# Patient Record
Sex: MALE | Race: OTHER | Employment: FULL TIME | URBAN - METROPOLITAN AREA
[De-identification: names, ages, dates, MRNs, and addresses within clinical notes are randomized per-mention and may not be internally consistent; named-entity substitution may affect disease eponyms.]

---

## 2023-08-28 ENCOUNTER — NURSE TRIAGE (OUTPATIENT)
Age: 68
End: 2023-08-28

## 2023-08-28 NOTE — TELEPHONE ENCOUNTER
Regarding: Blood in urine  ----- Message from Shahana Garcia sent at 8/28/2023 11:05 AM EDT -----  Pt called and stated that he has blood in his urine for the past week or two. And he states he is having difficulty urinating. Over the last month or two when he states up he has urgency. He is a new patient never been here before.

## 2023-08-28 NOTE — TELEPHONE ENCOUNTER
Pt reports intermittent blood in urine. On blood thinner, does clear up when he hold them. Cardiology recommends urology appt. Pt would like to only see Dr. Kasey Car. Appt scheduled for 10/31/23. Did advise patient to also see PCP due to appt timeframe to make sure no infection. Pt reports does have appt with them next week.      Reason for Disposition  • Patient wants to be seen    Protocols used: URINE - BLOOD IN-ADULT-OH

## 2023-09-22 ENCOUNTER — HOSPITAL ENCOUNTER (OUTPATIENT)
Dept: RADIOLOGY | Age: 68
Discharge: HOME | End: 2023-09-22
Payer: MEDICARE

## 2023-09-22 DIAGNOSIS — R31.0 GROSS HEMATURIA: ICD-10-CM

## 2023-09-22 DIAGNOSIS — N20.0 CALCULUS OF KIDNEY: ICD-10-CM

## 2023-09-22 PROCEDURE — G1004 CDSM NDSC: HCPCS

## 2023-09-22 PROCEDURE — 74178 CT ABD&PLV WO CNTR FLWD CNTR: CPT

## 2023-09-22 RX ADMIN — IOHEXOL 100 ML: 350 INJECTION, SOLUTION INTRAVENOUS at 14:35

## 2023-10-31 ENCOUNTER — TELEPHONE (OUTPATIENT)
Age: 68
End: 2023-10-31

## 2023-10-31 ENCOUNTER — OFFICE VISIT (OUTPATIENT)
Dept: UROLOGY | Facility: AMBULATORY SURGERY CENTER | Age: 68
End: 2023-10-31
Payer: MEDICARE

## 2023-10-31 VITALS
HEIGHT: 68 IN | WEIGHT: 278 LBS | HEART RATE: 74 BPM | SYSTOLIC BLOOD PRESSURE: 140 MMHG | BODY MASS INDEX: 42.13 KG/M2 | DIASTOLIC BLOOD PRESSURE: 72 MMHG

## 2023-10-31 DIAGNOSIS — K40.90 UNILATERAL INGUINAL HERNIA WITHOUT OBSTRUCTION OR GANGRENE, RECURRENCE NOT SPECIFIED: ICD-10-CM

## 2023-10-31 DIAGNOSIS — N20.0 NEPHROLITHIASIS: ICD-10-CM

## 2023-10-31 DIAGNOSIS — R31.9 HEMATURIA, UNSPECIFIED TYPE: Primary | ICD-10-CM

## 2023-10-31 DIAGNOSIS — R31.0 GROSS HEMATURIA: ICD-10-CM

## 2023-10-31 LAB
SL AMB  POCT GLUCOSE, UA: ABNORMAL
SL AMB LEUKOCYTE ESTERASE,UA: ABNORMAL
SL AMB POCT BILIRUBIN,UA: ABNORMAL
SL AMB POCT BLOOD,UA: ABNORMAL
SL AMB POCT CLARITY,UA: ABNORMAL
SL AMB POCT COLOR,UA: ABNORMAL
SL AMB POCT KETONES,UA: ABNORMAL
SL AMB POCT NITRITE,UA: ABNORMAL
SL AMB POCT PH,UA: 5
SL AMB POCT SPECIFIC GRAVITY,UA: 1.02
SL AMB POCT URINE PROTEIN: ABNORMAL
SL AMB POCT UROBILINOGEN: 0.2

## 2023-10-31 PROCEDURE — 81002 URINALYSIS NONAUTO W/O SCOPE: CPT | Performed by: UROLOGY

## 2023-10-31 PROCEDURE — 99204 OFFICE O/P NEW MOD 45 MIN: CPT | Performed by: UROLOGY

## 2023-10-31 RX ORDER — DOFETILIDE 0.5 MG/1
CAPSULE ORAL
COMMUNITY
Start: 2020-01-19

## 2023-10-31 RX ORDER — SODIUM CHLORIDE 9 MG/ML
125 INJECTION, SOLUTION INTRAVENOUS CONTINUOUS
OUTPATIENT
Start: 2023-10-31

## 2023-10-31 RX ORDER — OMEGA-3/DHA/EPA/FISH OIL 300-1000MG
2 CAPSULE ORAL DAILY
COMMUNITY

## 2023-10-31 RX ORDER — DILTIAZEM HYDROCHLORIDE 120 MG/1
120 CAPSULE, COATED, EXTENDED RELEASE ORAL DAILY
COMMUNITY
Start: 2023-08-11

## 2023-10-31 RX ORDER — DIPHENOXYLATE HYDROCHLORIDE AND ATROPINE SULFATE 2.5; .025 MG/1; MG/1
1 TABLET ORAL DAILY
COMMUNITY

## 2023-10-31 RX ORDER — OLMESARTAN MEDOXOMIL 20 MG/1
TABLET ORAL
COMMUNITY

## 2023-10-31 NOTE — TELEPHONE ENCOUNTER
Pt saw Dr. Tonya Garcia today. Pt was not sure if  would call him today or if he would receive the call in the next few days. I explained the CS call timeframe of 5 business days. PT was fine with that. He just wasn't sure if he would be called today.

## 2023-10-31 NOTE — PROGRESS NOTES
10/31/2023    Maria Esther Vegas  1955  [id number]        Assessment  Gross hematuria on anticoagulation, 1 cm right renal pelvis calculus, large sliding right inguinal hernia containing bladder, routine prostate cancer screening, family history of prostate cancer      Discussion  First we discussed his CT renal protocol which fortunately shows no evidence of malignancy or neoplastic process. The scan does reveal 1 cm right renal pelvis calculus which could very well be the etiology for his gross hematuria on Pradaxa. As he will likely be on anticoagulation indefinitely we discussed performing right-sided ureteroscopy with holmium laser lithotripsy, right retrograde pyelography and right ureteral stent insertion to remove the stone which is too large to pass and to alleviate his intermittent gross hematuria. I recommend that he hold his Pradaxa for 48 hours prior to surgery as he is on anticoagulation for atrial fibrillation and currently he is in sinus rhythm. Next we discussed his large right inguinal hernia containing a portion of his bladder. I recommended referral to Dr. Candance Fleming for evaluation. Surgery for inguinal hernia repair could be challenging secondary to the patient's body habitus. Last we discussed his family history of prostate cancer. His most recent PSA level is under 2. The patient defers digital rectal examination today and this will be performed at the time of ureteroscopy. History of Present Illness  77 y.o. male with a history of gross hematuria. This has been intermittent. He denies any flank pain. He had a CT renal protocol as per below. Recent PSA level from September 2023 is 1.86. He states that his father had prostate cancer. He underwent a CT renal protocol performed on September 22, 2023 which reveals multiple simple renal cyst on the right kidney. There is a 1 cm nonobstructing renal pelvis calculus on the right.   A moderate right inguinal hernia containing a portion of the bladder is identified as well. 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 AUA Symptom Score  AUA SYMPTOM SCORE      Flowsheet Row Most Recent Value   AUA SYMPTOM SCORE    How often have you had a sensation of not emptying your bladder completely after you finished urinating? 2 (P)     How often have you had to urinate again less than two hours after you finished urinating? 2 (P)     How often have you found you stopped and started again several times when you urinate? 0 (P)     How often have you found it difficult to postpone urination? 1 (P)     How often have you had a weak urinary stream? 1 (P)     How often have you had to push or strain to begin urination? 2 (P)     How many times did you most typically get up to urinate from the time you went to bed at night until the time you got up in the morning? 3 (P)     Quality of Life: If you were to spend the rest of your life with your urinary condition just the way it is now, how would you feel about that? 3 (P)     AUA SYMPTOM SCORE 11 (P)             Review of Systems  Review of Systems   Constitutional: Negative. HENT: Negative. Eyes: Negative. Respiratory: Negative. Cardiovascular: Negative. Gastrointestinal: Negative. Endocrine: Negative. Genitourinary:         Per HPI   Musculoskeletal: Negative. Skin: Negative. Allergic/Immunologic: Negative. Neurological: Negative. Hematological: Negative. Psychiatric/Behavioral: Negative. Past Medical History  Past Medical History:   Diagnosis Date    History of kidney stones     Hypertension     Irregular heart beat     Macular degeneration     Paroxysmal SVT (supraventricular tachycardia) (HCC)        Past Social History  Past Surgical History:   Procedure Laterality Date    APPENDECTOMY      COLONOSCOPY N/A 12/9/2016    Procedure: COLONOSCOPY;  Surgeon: Zahra Garcia MD;  Location: [facility];   Service:     HAND SURGERY Left TONSILLECTOMY AND ADENOIDECTOMY         Past Family History  Family History   Problem Relation Age of Onset    Cancer Mother     Cancer Father        Past Social history  Social History     Socioeconomic History    Marital status: /Civil Union     Spouse name: Not on file    Number of children: Not on file    Years of education: Not on file    Highest education level: Not on file   Occupational History    Not on file   Tobacco Use    Smoking status: Never    Smokeless tobacco: Never   Substance and Sexual Activity    Alcohol use:  Yes     Alcohol/week: 2.0 standard drinks of alcohol     Types: 2 Cans of beer per week    Drug use: No    Sexual activity: Not on file   Other Topics Concern    Not on file   Social History Narrative    Not on file     Social Determinants of Health     Financial Resource Strain: Not on file   Food Insecurity: Not on file   Transportation Needs: Not on file   Physical Activity: Not on file   Stress: Not on file   Social Connections: Not on file   Intimate Partner Violence: Not on file   Housing Stability: Not on file       Current Medications  Current Outpatient Medications   Medication Sig Dispense Refill    cloNIDine (CATAPRES) 0.1 mg tablet Take 0.1 mg by mouth 2 (two) times a day      diltiazem (CARDIZEM CD) 120 mg 24 hr capsule Take 120 mg by mouth daily      dofetilide (TIKOSYN) 500 mcg capsule       nebivolol (BYSTOLIC) 5 mg tablet Take 20 mg by mouth daily      potassium chloride (K-DUR,KLOR-CON) 20 mEq tablet Take 20 mEq by mouth 2 (two) times a day      rivaroxaban (XARELTO) 20 mg tablet Take 20 mg by mouth daily      spironolactone (ALDACTONE) 25 mg tablet Take 25 mg by mouth daily      fish oil-omega-3 fatty acids 1000 MG capsule Take 2 g by mouth daily      losartan (COZAAR) 50 mg tablet Take 50 mg by mouth daily      multivitamin (THERAGRAN) TABS Take 1 tablet by mouth daily      olmesartan (BENICAR) 20 mg tablet       quinapril-hydrochlorothiazide (ACCURETIC) 20-12.5 MG per tablet Take 1 tablet by mouth 2 (two) times a day       No current facility-administered medications for this visit. Allergies  No Known Allergies    Past Medical History, Social History, Family History, medications and allergies were reviewed. Vitals  Vitals:    10/31/23 0723   Weight: 108 kg (238 lb)       Physical Exam  Physical Exam  On examination he is in no acute distress. His abdomen is soft obese nontender nondistended. Right inguinal hernia is appreciated but difficult to reduce. The phallus is buried secondary to the adipose tissue and the hernia. The patient defers digital rectal examination today. Skin is warm. Extremities without edema. Neurologic is remarkable for significant visual impairment. Gait normal.  Affect normal.   examination is no CVA tenderness.       Results  No results found for: "PSA"  No results found for: "GLUCOSE", "CALCIUM", "NA", "K", "CO2", "CL", "BUN", "CREATININE"  No results found for: "WBC", "HGB", "HCT", "MCV", "PLT"      Office Urine Dip  No results found for this or any previous visit (from the past 1 hour(s)).]

## 2023-10-31 NOTE — LETTER
October 31, 2023     [facility], [address] [place] [address] GALA Levy    Patient: Chandrakant Rehman   YOB: 1955   Date of Visit: 10/31/2023       Dear Dr. 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 Below are my notes for this consultation. If you have questions, please do not hesitate to call me. I look forward to following your patient along with you. Sincerely,        Zulma Velásquez MD        CC: MD Zulma Son MD  10/31/2023  8:03 AM  Sign when Signing Visit  10/31/2023    Chandrakant Rehman  1955  [id number]        Assessment  Gross hematuria on anticoagulation, 1 cm right renal pelvis calculus, large sliding right inguinal hernia containing bladder, routine prostate cancer screening, family history of prostate cancer      Discussion  First we discussed his CT renal protocol which fortunately shows no evidence of malignancy or neoplastic process. The scan does reveal 1 cm right renal pelvis calculus which could very well be the etiology for his gross hematuria on Pradaxa. As he will likely be on anticoagulation indefinitely we discussed performing right-sided ureteroscopy with holmium laser lithotripsy, right retrograde pyelography and right ureteral stent insertion to remove the stone which is too large to pass and to alleviate his intermittent gross hematuria. I recommend that he hold his Pradaxa for 48 hours prior to surgery as he is on anticoagulation for atrial fibrillation and currently he is in sinus rhythm. Next we discussed his large right inguinal hernia containing a portion of his bladder. I recommended referral to Dr. Tom Santiago for evaluation. Surgery for inguinal hernia repair could be challenging secondary to the patient's body habitus. Last we discussed his family history of prostate cancer. His most recent PSA level is under 2.   The patient defers digital rectal examination today and this will be performed at the time of ureteroscopy. History of Present Illness  77 y.o. male with a history of gross hematuria. This has been intermittent. He denies any flank pain. He had a CT renal protocol as per below. Recent PSA level from September 2023 is 1.86. He states that his father had prostate cancer. He underwent a CT renal protocol performed on September 22, 2023 which reveals multiple simple renal cyst on the right kidney. There is a 1 cm nonobstructing renal pelvis calculus on the right. A moderate right inguinal hernia containing a portion of the bladder is identified as well. 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 AUA Symptom Score  AUA SYMPTOM SCORE      Flowsheet Row Most Recent Value   AUA SYMPTOM SCORE    How often have you had a sensation of not emptying your bladder completely after you finished urinating? 2 (P)     How often have you had to urinate again less than two hours after you finished urinating? 2 (P)     How often have you found you stopped and started again several times when you urinate? 0 (P)     How often have you found it difficult to postpone urination? 1 (P)     How often have you had a weak urinary stream? 1 (P)     How often have you had to push or strain to begin urination? 2 (P)     How many times did you most typically get up to urinate from the time you went to bed at night until the time you got up in the morning? 3 (P)     Quality of Life: If you were to spend the rest of your life with your urinary condition just the way it is now, how would you feel about that? 3 (P)     AUA SYMPTOM SCORE 11 (P)             Review of Systems  Review of Systems   Constitutional: Negative. HENT: Negative. Eyes: Negative. Respiratory: Negative. Cardiovascular: Negative. Gastrointestinal: Negative. Endocrine: Negative. Genitourinary:         Per HPI   Musculoskeletal: Negative.     Skin: Negative. Allergic/Immunologic: Negative. Neurological: Negative. Hematological: Negative. Psychiatric/Behavioral: Negative. Past Medical History  Past Medical History:   Diagnosis Date   • History of kidney stones    • Hypertension    • Irregular heart beat    • Macular degeneration    • Paroxysmal SVT (supraventricular tachycardia) (HCC)        Past Social History  Past Surgical History:   Procedure Laterality Date   • APPENDECTOMY     • COLONOSCOPY N/A 12/9/2016    Procedure: COLONOSCOPY;  Surgeon: Caitie Jo MD;  Location: [facility]; Service:    • HAND SURGERY Left    • TONSILLECTOMY AND ADENOIDECTOMY         Past Family History  Family History   Problem Relation Age of Onset   • Cancer Mother    • Cancer Father        Past Social history  Social History     Socioeconomic History   • Marital status: /Civil Union     Spouse name: Not on file   • Number of children: Not on file   • Years of education: Not on file   • Highest education level: Not on file   Occupational History   • Not on file   Tobacco Use   • Smoking status: Never   • Smokeless tobacco: Never   Substance and Sexual Activity   • Alcohol use:  Yes     Alcohol/week: 2.0 standard drinks of alcohol     Types: 2 Cans of beer per week   • Drug use: No   • Sexual activity: Not on file   Other Topics Concern   • Not on file   Social History Narrative   • Not on file     Social Determinants of Health     Financial Resource Strain: Not on file   Food Insecurity: Not on file   Transportation Needs: Not on file   Physical Activity: Not on file   Stress: Not on file   Social Connections: Not on file   Intimate Partner Violence: Not on file   Housing Stability: Not on file       Current Medications  Current Outpatient Medications   Medication Sig Dispense Refill   • cloNIDine (CATAPRES) 0.1 mg tablet Take 0.1 mg by mouth 2 (two) times a day     • diltiazem (CARDIZEM CD) 120 mg 24 hr capsule Take 120 mg by mouth daily     • dofetilide (TIKOSYN) 500 mcg capsule      • nebivolol (BYSTOLIC) 5 mg tablet Take 20 mg by mouth daily     • potassium chloride (K-DUR,KLOR-CON) 20 mEq tablet Take 20 mEq by mouth 2 (two) times a day     • rivaroxaban (XARELTO) 20 mg tablet Take 20 mg by mouth daily     • spironolactone (ALDACTONE) 25 mg tablet Take 25 mg by mouth daily     • fish oil-omega-3 fatty acids 1000 MG capsule Take 2 g by mouth daily     • losartan (COZAAR) 50 mg tablet Take 50 mg by mouth daily     • multivitamin (THERAGRAN) TABS Take 1 tablet by mouth daily     • olmesartan (BENICAR) 20 mg tablet      • quinapril-hydrochlorothiazide (ACCURETIC) 20-12.5 MG per tablet Take 1 tablet by mouth 2 (two) times a day       No current facility-administered medications for this visit. Allergies  No Known Allergies    Past Medical History, Social History, Family History, medications and allergies were reviewed. Vitals  Vitals:    10/31/23 0723   Weight: 108 kg (238 lb)       Physical Exam  Physical Exam  On examination he is in no acute distress. His abdomen is soft obese nontender nondistended. Right inguinal hernia is appreciated but difficult to reduce. The phallus is buried secondary to the adipose tissue and the hernia. The patient defers digital rectal examination today. Skin is warm. Extremities without edema. Neurologic is remarkable for significant visual impairment. Gait normal.  Affect normal.   examination is no CVA tenderness.       Results  No results found for: "PSA"  No results found for: "GLUCOSE", "CALCIUM", "NA", "K", "CO2", "CL", "BUN", "CREATININE"  No results found for: "WBC", "HGB", "HCT", "MCV", "PLT"      Office Urine Dip  No results found for this or any previous visit (from the past 1 hour(s)).]

## 2023-11-20 ENCOUNTER — OFFICE VISIT (OUTPATIENT)
Dept: SURGERY | Facility: CLINIC | Age: 68
End: 2023-11-20
Payer: MEDICARE

## 2023-11-20 ENCOUNTER — PREP FOR PROCEDURE (OUTPATIENT)
Dept: SURGERY | Facility: CLINIC | Age: 68
End: 2023-11-20

## 2023-11-20 VITALS
DIASTOLIC BLOOD PRESSURE: 86 MMHG | HEART RATE: 66 BPM | SYSTOLIC BLOOD PRESSURE: 154 MMHG | WEIGHT: 276 LBS | HEIGHT: 68 IN | BODY MASS INDEX: 41.83 KG/M2

## 2023-11-20 DIAGNOSIS — K40.90 INGUINAL HERNIA: Primary | ICD-10-CM

## 2023-11-20 DIAGNOSIS — K40.90 UNILATERAL INGUINAL HERNIA WITHOUT OBSTRUCTION OR GANGRENE, RECURRENCE NOT SPECIFIED: ICD-10-CM

## 2023-11-20 PROCEDURE — 99203 OFFICE O/P NEW LOW 30 MIN: CPT | Performed by: SURGERY

## 2023-11-20 NOTE — PROGRESS NOTES
Assessment/Plan: Patient presents with a large right inguinal hernia which contains fat as well as bladder. Has been a scrotal hernia for several years. At this point he has had some discomfort. This is noted on imaging for right renal 1 cm stone. I recommended operative repair. Risks and benefits explained. He is agreeable. 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 He has a history of atrial fibrillation. At today's visit his rhythm is regular. I asked him to hold his Pradaxa for 3 days before surgery. We will obtain cardiac clearance in addition. Diagnoses and all orders for this visit:    Unilateral inguinal hernia without obstruction or gangrene, recurrence not specified  -     Ambulatory Referral to General Surgery        Subjective:      Patient ID: Jose Galindo is a 77 y.o. male. Patient presents for right inguinal hernia consult. States he has had a bulge and achy pain RLQ for 3 months. Does not limit his activities. CT renal protocol 9/22/2023   IMPRESSION:  1 cm nonobstructive calculus in the right renal pelvis. Moderate sized right inguinal hernia containing a portion of the urinary bladder. Additional findings as described above. The following portions of the patient's history were reviewed and updated as appropriate:     He  has a past medical history of Abnormal ECG (12/12/2019), Colon polyp (12/12/19), COPD (chronic obstructive pulmonary disease) ([address]) (12/12/19 sleep apnea), History of kidney stones, Hypertension, Irregular heart beat, Macular degeneration, Obesity, and Paroxysmal SVT (supraventricular tachycardia). He  has a past surgical history that includes Tonsillectomy and adenoidectomy; Appendectomy; Hand surgery (Left); and Colonoscopy (N/A, 12/9/2016). His family history includes Cancer in his father and mother. He  reports that he has never smoked.  He has never used smokeless tobacco. He reports current alcohol use of about 1.0 standard drink of alcohol per week. He reports that he does not use drugs. Current Outpatient Medications   Medication Sig Dispense Refill    cloNIDine (CATAPRES) 0.1 mg tablet Take 0.1 mg by mouth 2 (two) times a day      diltiazem (CARDIZEM CD) 120 mg 24 hr capsule Take 120 mg by mouth daily      dofetilide (TIKOSYN) 500 mcg capsule       fish oil-omega-3 fatty acids 1000 MG capsule Take 2 g by mouth daily      losartan (COZAAR) 50 mg tablet Take 50 mg by mouth daily      multivitamin (THERAGRAN) TABS Take 1 tablet by mouth daily      nebivolol (BYSTOLIC) 5 mg tablet Take 20 mg by mouth daily      olmesartan (BENICAR) 20 mg tablet       potassium chloride (K-DUR,KLOR-CON) 20 mEq tablet Take 20 mEq by mouth 2 (two) times a day      quinapril-hydrochlorothiazide (ACCURETIC) 20-12.5 MG per tablet Take 1 tablet by mouth 2 (two) times a day      rivaroxaban (XARELTO) 20 mg tablet Take 20 mg by mouth daily      spironolactone (ALDACTONE) 25 mg tablet Take 25 mg by mouth daily       No current facility-administered medications for this visit. He has No Known Allergies. .    Review of Systems   Constitutional: Negative. Negative for activity change. HENT: Negative. Eyes: Negative. Respiratory: Negative. Cardiovascular: Negative. Gastrointestinal:  Positive for abdominal pain. Endocrine: Negative. Genitourinary: Negative. Musculoskeletal: Negative. Skin: Negative. Allergic/Immunologic: Negative. Neurological: Negative. Psychiatric/Behavioral:  Negative for agitation, behavioral problems and confusion. The patient is not nervous/anxious. Objective:      /86   Pulse 66   Ht 5' 8" (1.727 m)   Wt 125 kg (276 lb)   BMI 41.97 kg/m²          Physical Exam  Constitutional:       Appearance: He is well-developed. He is not diaphoretic. HENT:      Head: Normocephalic and atraumatic.    Eyes: normal... General: No scleral icterus. Right eye: No discharge. Left eye: No discharge. Extraocular Movements: Extraocular movements intact. Conjunctiva/sclera: Conjunctivae normal.   Neck:      Thyroid: No thyromegaly. Trachea: No tracheal deviation. Cardiovascular:      Rate and Rhythm: Normal rate and regular rhythm. Heart sounds: Normal heart sounds. No murmur heard. No friction rub. Pulmonary:      Effort: Pulmonary effort is normal. No respiratory distress. Breath sounds: Normal breath sounds. No stridor. No wheezing. Chest:      Chest wall: No tenderness. Abdominal:      General: There is no distension. Palpations: There is no mass. Tenderness: There is no abdominal tenderness. There is no guarding or rebound. Hernia: A hernia (Large right scrotal hernia containing small bowel and bladder. This is not reducible. No evidence for left inguinal herniation.) is present. Musculoskeletal:         General: No tenderness. Right lower leg: No edema. Left lower leg: No edema. Lymphadenopathy:      Cervical: No cervical adenopathy. Skin:     General: Skin is warm and dry. Findings: No erythema or rash. Neurological:      Mental Status: He is alert and oriented to person, place, and time. Cranial Nerves: No cranial nerve deficit.       Coordination: Coordination normal.   Psychiatric:         Behavior: Behavior normal.         Judgment: Judgment normal. Well appearing, well nourished, awake, alert, oriented to person, place, time/situation and in MILD apparent distress.

## 2023-11-21 ENCOUNTER — PREP FOR PROCEDURE (OUTPATIENT)
Dept: UROLOGY | Facility: AMBULATORY SURGERY CENTER | Age: 68
End: 2023-11-21

## 2023-11-21 ENCOUNTER — TELEPHONE (OUTPATIENT)
Age: 68
End: 2023-11-21

## 2023-11-21 DIAGNOSIS — R39.89 SUSPECTED UTI: ICD-10-CM

## 2023-11-21 DIAGNOSIS — R31.9 HEMATURIA, UNSPECIFIED TYPE: ICD-10-CM

## 2023-11-21 DIAGNOSIS — N20.0 NEPHROLITHIASIS: Primary | ICD-10-CM

## 2023-11-21 NOTE — TELEPHONE ENCOUNTER
I spoke with pt and offered to schedule him for surgery with Dr. Maurice Gr at the [facility] on 12/7/23 but pt declined and stated that he will be having a hernia surgery done 1st and wanted to schedule this procedure after 1/24/24/ I offered to schedule pt on 1/26/24 at the [facility] and he confirmed that this will work for him. I verbally went over all of pt.'s pre op instructions and prep information with him. He is aware that he needs to have a urine culture 2 weeks prior to surgery. I also informed pt that I will be contacting Dr. Kvng Anders at [facility] in [place] cardiology to confirm that he can stop his Pradaxa 2 days prior to this procedure. Per pt.'s request I will be mailing him a copy of his surgical packet and instructed him to call our office with any questions or concerns regarding this procedure.

## 2023-12-01 ENCOUNTER — APPOINTMENT (OUTPATIENT)
Dept: LAB | Age: 68
End: 2023-12-01
Payer: MEDICARE

## 2023-12-01 DIAGNOSIS — K40.90 INGUINAL HERNIA: ICD-10-CM

## 2023-12-01 LAB
ALBUMIN SERPL BCP-MCNC: 4.6 G/DL (ref 3.5–5)
ALP SERPL-CCNC: 71 U/L (ref 34–104)
ALT SERPL W P-5'-P-CCNC: 89 U/L (ref 7–52)
ANION GAP SERPL CALCULATED.3IONS-SCNC: 9 MMOL/L
AST SERPL W P-5'-P-CCNC: 57 U/L (ref 13–39)
BILIRUB SERPL-MCNC: 0.96 MG/DL (ref 0.2–1)
BUN SERPL-MCNC: 11 MG/DL (ref 5–25)
CALCIUM SERPL-MCNC: 9.5 MG/DL (ref 8.4–10.2)
CHLORIDE SERPL-SCNC: 103 MMOL/L (ref 96–108)
CO2 SERPL-SCNC: 28 MMOL/L (ref 21–32)
CREAT SERPL-MCNC: 0.93 MG/DL (ref 0.6–1.3)
ERYTHROCYTE [DISTWIDTH] IN BLOOD BY AUTOMATED COUNT: 12.6 % (ref 11.6–15.1)
GFR SERPL CREATININE-BSD FRML MDRD: 82 ML/MIN/1.73SQ M
GLUCOSE P FAST SERPL-MCNC: 76 MG/DL (ref 65–99)
HCT VFR BLD AUTO: 49.5 % (ref 36.5–49.3)
HGB BLD-MCNC: 16.8 G/DL (ref 12–17)
MCH RBC QN AUTO: 31.3 PG (ref 26.8–34.3)
MCHC RBC AUTO-ENTMCNC: 33.9 G/DL (ref 31.4–37.4)
MCV RBC AUTO: 92 FL (ref 82–98)
PLATELET # BLD AUTO: 211 THOUSANDS/UL (ref 149–390)
PMV BLD AUTO: 8.6 FL (ref 8.9–12.7)
POTASSIUM SERPL-SCNC: 4 MMOL/L (ref 3.5–5.3)
PROT SERPL-MCNC: 7.3 G/DL (ref 6.4–8.4)
RBC # BLD AUTO: 5.36 MILLION/UL (ref 3.88–5.62)
SODIUM SERPL-SCNC: 140 MMOL/L (ref 135–147)
WBC # BLD AUTO: 8.33 THOUSAND/UL (ref 4.31–10.16)

## 2023-12-01 PROCEDURE — 36415 COLL VENOUS BLD VENIPUNCTURE: CPT

## 2023-12-01 PROCEDURE — 80053 COMPREHEN METABOLIC PANEL: CPT

## 2023-12-01 PROCEDURE — 85027 COMPLETE CBC AUTOMATED: CPT

## 2023-12-02 LAB
ATRIAL RATE: 60 BPM
P AXIS: 1 DEGREES
PR INTERVAL: 184 MS
QRS AXIS: 31 DEGREES
QRSD INTERVAL: 90 MS
QT INTERVAL: 440 MS
QTC INTERVAL: 440 MS
T WAVE AXIS: 43 DEGREES
VENTRICULAR RATE: 60 BPM

## 2023-12-05 ENCOUNTER — ANESTHESIA EVENT (OUTPATIENT)
Dept: PERIOP | Facility: HOSPITAL | Age: 68
End: 2023-12-05
Payer: MEDICARE

## 2023-12-05 RX ORDER — FUROSEMIDE 20 MG/1
20 TABLET ORAL 2 TIMES DAILY
COMMUNITY

## 2023-12-05 RX ORDER — DABIGATRAN ETEXILATE 150 MG/1
150 CAPSULE ORAL 2 TIMES DAILY
COMMUNITY

## 2023-12-05 NOTE — PRE-PROCEDURE INSTRUCTIONS
Pre-Surgery Instructions:   Medication Instructions    cloNIDine (CATAPRES) 0.1 mg tablet Take day of surgery. dabigatran etexilate (PRADAXA) 150 mg capsu LD 12/9/23 per Dr. Neena jaimes [facility] CD) 120 mg 24 hr capsule Take day of surgery. dofetilide (TIKOSYN) 500 mcg capsule Take day of surgery. fish oil-omega-3 fatty acids 1000 MG capsule Stop taking 7 days prior to surgery. furosemide (LASIX) 20 mg tablet Hold day of surgery. multivitamin (THERAGRAN) TABS Stop taking 7 days prior to surgery. nebivolol (BYSTOLIC) 5 mg tablet Take day of surgery. olmesartan (BENICAR) 20 mg tablet Hold day of surgery. potassium chloride (K-DUR,KLOR-CON) 20 mEq tablet Hold day of surgery. spironolactone (ALDACTONE) 25 mg tablet Take as directed with dinner night before surgery. Medication instructions for day surgery reviewed. Please use only a sip of water to take your instructed medications. Avoid all over the counter vitamins, supplements and NSAIDS for one week prior to surgery per anesthesia guidelines. Tylenol is ok to take as needed. You will receive a call one business day prior to surgery with an arrival time and hospital directions. If your surgery is scheduled on a Monday, the hospital will be calling you on the Friday prior to your surgery. If you have not heard from anyone by 8pm, please call the hospital supervisor through the hospital  at [phone]. Jayda English [phone]). Do not eat or drink anything after midnight the night before your surgery, including candy, mints, lifesavers, or chewing gum. Do not drink alcohol 24hrs before your surgery. Try not to smoke at least 24hrs before your surgery. Follow the pre surgery showering instructions as listed in the [facility] Surgical Experience Booklet” or otherwise provided by your surgeon's office. Do not use a blade to shave the surgical area 1 week before surgery.  It is okay to use a clean electric clippers up to 24 hours before surgery. Do not apply any lotions, creams, including makeup, cologne, deodorant, or perfumes after showering on the day of your surgery. Do not use dry shampoo, hair spray, hair gel, or any type of hair products. No contact lenses, eye make-up, or artificial eyelashes. Remove nail polish, including gel polish, and any artificial, gel, or acrylic nails if possible. Remove all jewelry including rings and body piercing jewelry. Wear causal clothing that is easy to take on and off. Consider your type of surgery. Keep any valuables, jewelry, piercings at home. Please bring any specially ordered equipment (sling, braces) if indicated. Arrange for a responsible person to drive you to and from the hospital on the day of your surgery. Visitor Guidelines discussed. Call the surgeon's office with any new illnesses, exposures, or additional questions prior to surgery. Please reference your [facility] Surgical Experience Booklet” for additional information to prepare for your upcoming surgery.

## 2023-12-08 RX ORDER — OXYCODONE HYDROCHLORIDE AND ACETAMINOPHEN 5; 325 MG/1; MG/1
1 TABLET ORAL EVERY 4 HOURS PRN
Qty: 10 TABLET | Refills: 0 | Status: SHIPPED | OUTPATIENT
Start: 2023-12-08

## 2023-12-08 NOTE — DISCHARGE INSTR - AVS FIRST PAGE
Please call the office when you leave to schedule an appointment for 2 weeks. Please call [phone]. [address] [place] [address], [address], [place] [address]. Off of [address] between [facility] and [facility]. May resume Pradaxa tomorrow          Activity:    May lift 10 lb as many times as desired the 1st week,       20 lb in 2 weeks,       30 lb in 3 weeks. Walking is encouraged  Normal daily activities including climbing steps are okay  Do not engage in strenuous activity ( sit-ups or crutches) or contact sports for 4-6 weeks post-operatively    Return to Work:   Okay to return to work when you feel well if you desire. Diet:   You may return to your normal healthy diet. Wound Care: Your wound is closed with dissolvable stitches and glue. It is okay to shower. Wash incision gently with soap and water and pat dry. Do not soak incisions in bath water or swim for two weeks. Do not apply any creams or ointments. Pain Medication:   Please take as directed if needed. May use Advil or Motrin in addition. Recall, the pain medicine and anesthesia is associated with constipation. No driving while taking narcotic pain medications. Other: It is normal to developed a “healing ridge” / firm incision after surgery. This is your body making scar tissue. It is a good sign  Constipation is very common after general anesthesia. Please use milk of magnesia as needed in order to help prevent constipation. It is normal to get bruising after surgery. If you have questions after discharge please call the office.     If you have increased pain, fever >101.5, increased drainage, redness or a bad smell at your surgery site, please call us immediately or come directly to the Emergency Room

## 2023-12-13 ENCOUNTER — ANESTHESIA (OUTPATIENT)
Dept: PERIOP | Facility: HOSPITAL | Age: 68
End: 2023-12-13
Payer: MEDICARE

## 2023-12-13 ENCOUNTER — HOSPITAL ENCOUNTER (OUTPATIENT)
Facility: HOSPITAL | Age: 68
Setting detail: OUTPATIENT SURGERY
Discharge: HOME | End: 2023-12-13
Attending: SURGERY | Admitting: SURGERY
Payer: MEDICARE

## 2023-12-13 VITALS
HEART RATE: 68 BPM | HEIGHT: 68 IN | DIASTOLIC BLOOD PRESSURE: 78 MMHG | WEIGHT: 276 LBS | TEMPERATURE: 98.6 F | RESPIRATION RATE: 18 BRPM | SYSTOLIC BLOOD PRESSURE: 136 MMHG | BODY MASS INDEX: 41.83 KG/M2 | OXYGEN SATURATION: 92 %

## 2023-12-13 DIAGNOSIS — K40.90 UNILATERAL INGUINAL HERNIA WITHOUT OBSTRUCTION OR GANGRENE: Primary | ICD-10-CM

## 2023-12-13 PROBLEM — I48.91 ATRIAL FIBRILLATION (HCC): Status: ACTIVE | Noted: 2023-12-13

## 2023-12-13 PROCEDURE — C1781 MESH (IMPLANTABLE): HCPCS | Performed by: SURGERY

## 2023-12-13 DEVICE — MODIFIED ONFLEX MESH
Type: IMPLANTABLE DEVICE | Status: FUNCTIONAL
Brand: MODIFIED ONFLEX MESH

## 2023-12-13 RX ORDER — HYDROMORPHONE HCL/PF 1 MG/ML
0.5 SYRINGE (ML) INJECTION
Status: DISCONTINUED | OUTPATIENT
Start: 2023-12-13 | End: 2023-12-13 | Stop reason: HOSPADM

## 2023-12-13 RX ORDER — CEFAZOLIN SODIUM 1 G/3ML
INJECTION, POWDER, FOR SOLUTION INTRAMUSCULAR; INTRAVENOUS AS NEEDED
Status: DISCONTINUED | OUTPATIENT
Start: 2023-12-13 | End: 2023-12-13

## 2023-12-13 RX ORDER — DEXAMETHASONE SODIUM PHOSPHATE 10 MG/ML
INJECTION, SOLUTION INTRAMUSCULAR; INTRAVENOUS AS NEEDED
Status: DISCONTINUED | OUTPATIENT
Start: 2023-12-13 | End: 2023-12-13

## 2023-12-13 RX ORDER — MIDAZOLAM HYDROCHLORIDE 2 MG/2ML
INJECTION, SOLUTION INTRAMUSCULAR; INTRAVENOUS AS NEEDED
Status: DISCONTINUED | OUTPATIENT
Start: 2023-12-13 | End: 2023-12-13

## 2023-12-13 RX ORDER — ONDANSETRON 2 MG/ML
4 INJECTION INTRAMUSCULAR; INTRAVENOUS EVERY 4 HOURS PRN
Status: DISCONTINUED | OUTPATIENT
Start: 2023-12-13 | End: 2023-12-13 | Stop reason: HOSPADM

## 2023-12-13 RX ORDER — LIDOCAINE HYDROCHLORIDE 10 MG/ML
INJECTION, SOLUTION EPIDURAL; INFILTRATION; INTRACAUDAL; PERINEURAL AS NEEDED
Status: DISCONTINUED | OUTPATIENT
Start: 2023-12-13 | End: 2023-12-13

## 2023-12-13 RX ORDER — ONDANSETRON 2 MG/ML
4 INJECTION INTRAMUSCULAR; INTRAVENOUS ONCE AS NEEDED
Status: DISCONTINUED | OUTPATIENT
Start: 2023-12-13 | End: 2023-12-13 | Stop reason: HOSPADM

## 2023-12-13 RX ORDER — FENTANYL CITRATE 50 UG/ML
INJECTION, SOLUTION INTRAMUSCULAR; INTRAVENOUS AS NEEDED
Status: DISCONTINUED | OUTPATIENT
Start: 2023-12-13 | End: 2023-12-13

## 2023-12-13 RX ORDER — ACETAMINOPHEN 325 MG/1
650 TABLET ORAL EVERY 4 HOURS PRN
Status: DISCONTINUED | OUTPATIENT
Start: 2023-12-13 | End: 2023-12-13 | Stop reason: HOSPADM

## 2023-12-13 RX ORDER — CEFAZOLIN SODIUM 2 G/50ML
2000 SOLUTION INTRAVENOUS ONCE
Status: DISCONTINUED | OUTPATIENT
Start: 2023-12-13 | End: 2023-12-13 | Stop reason: HOSPADM

## 2023-12-13 RX ORDER — HYDROMORPHONE HCL/PF 1 MG/ML
SYRINGE (ML) INJECTION AS NEEDED
Status: DISCONTINUED | OUTPATIENT
Start: 2023-12-13 | End: 2023-12-13

## 2023-12-13 RX ORDER — FUROSEMIDE 10 MG/ML
INJECTION INTRAMUSCULAR; INTRAVENOUS AS NEEDED
Status: DISCONTINUED | OUTPATIENT
Start: 2023-12-13 | End: 2023-12-13

## 2023-12-13 RX ORDER — FENTANYL CITRATE/PF 50 MCG/ML
25 SYRINGE (ML) INJECTION
Status: DISCONTINUED | OUTPATIENT
Start: 2023-12-13 | End: 2023-12-13 | Stop reason: HOSPADM

## 2023-12-13 RX ORDER — BUPIVACAINE HYDROCHLORIDE 2.5 MG/ML
INJECTION, SOLUTION EPIDURAL; INFILTRATION; INTRACAUDAL AS NEEDED
Status: DISCONTINUED | OUTPATIENT
Start: 2023-12-13 | End: 2023-12-13 | Stop reason: HOSPADM

## 2023-12-13 RX ORDER — HYDROMORPHONE HCL/PF 1 MG/ML
0.4 SYRINGE (ML) INJECTION
Status: DISCONTINUED | OUTPATIENT
Start: 2023-12-13 | End: 2023-12-13 | Stop reason: HOSPADM

## 2023-12-13 RX ORDER — OXYCODONE HYDROCHLORIDE AND ACETAMINOPHEN 5; 325 MG/1; MG/1
1 TABLET ORAL EVERY 4 HOURS PRN
Status: DISCONTINUED | OUTPATIENT
Start: 2023-12-13 | End: 2023-12-13 | Stop reason: HOSPADM

## 2023-12-13 RX ORDER — PROPOFOL 10 MG/ML
INJECTION, EMULSION INTRAVENOUS AS NEEDED
Status: DISCONTINUED | OUTPATIENT
Start: 2023-12-13 | End: 2023-12-13

## 2023-12-13 RX ORDER — ONDANSETRON 2 MG/ML
INJECTION INTRAMUSCULAR; INTRAVENOUS AS NEEDED
Status: DISCONTINUED | OUTPATIENT
Start: 2023-12-13 | End: 2023-12-13

## 2023-12-13 RX ORDER — KETOROLAC TROMETHAMINE 30 MG/ML
INJECTION, SOLUTION INTRAMUSCULAR; INTRAVENOUS AS NEEDED
Status: DISCONTINUED | OUTPATIENT
Start: 2023-12-13 | End: 2023-12-13

## 2023-12-13 RX ORDER — ROCURONIUM BROMIDE 10 MG/ML
INJECTION, SOLUTION INTRAVENOUS AS NEEDED
Status: DISCONTINUED | OUTPATIENT
Start: 2023-12-13 | End: 2023-12-13

## 2023-12-13 RX ORDER — SODIUM CHLORIDE, SODIUM LACTATE, POTASSIUM CHLORIDE, CALCIUM CHLORIDE 600; 310; 30; 20 MG/100ML; MG/100ML; MG/100ML; MG/100ML
INJECTION, SOLUTION INTRAVENOUS CONTINUOUS PRN
Status: DISCONTINUED | OUTPATIENT
Start: 2023-12-13 | End: 2023-12-13

## 2023-12-13 RX ADMIN — PROPOFOL 200 MG: 10 INJECTION, EMULSION INTRAVENOUS at 08:35

## 2023-12-13 RX ADMIN — ROCURONIUM BROMIDE 10 MG: 10 INJECTION, SOLUTION INTRAVENOUS at 10:20

## 2023-12-13 RX ADMIN — PROPOFOL 20 MG: 10 INJECTION, EMULSION INTRAVENOUS at 09:19

## 2023-12-13 RX ADMIN — HYDROMORPHONE HYDROCHLORIDE 1 MG: 1 INJECTION, SOLUTION INTRAMUSCULAR; INTRAVENOUS; SUBCUTANEOUS at 09:21

## 2023-12-13 RX ADMIN — ROCURONIUM BROMIDE 20 MG: 10 INJECTION, SOLUTION INTRAVENOUS at 09:19

## 2023-12-13 RX ADMIN — FENTANYL CITRATE 100 MCG: 50 INJECTION INTRAMUSCULAR; INTRAVENOUS at 08:35

## 2023-12-13 RX ADMIN — LIDOCAINE HYDROCHLORIDE 50 MG: 10 INJECTION, SOLUTION EPIDURAL; INFILTRATION; INTRACAUDAL; PERINEURAL at 08:35

## 2023-12-13 RX ADMIN — MIDAZOLAM 2 MG: 1 INJECTION INTRAMUSCULAR; INTRAVENOUS at 08:25

## 2023-12-13 RX ADMIN — DEXAMETHASONE SODIUM PHOSPHATE 10 MG: 10 INJECTION, SOLUTION INTRAMUSCULAR; INTRAVENOUS at 08:40

## 2023-12-13 RX ADMIN — FUROSEMIDE 5 MG: 10 INJECTION, SOLUTION INTRAVENOUS at 10:45

## 2023-12-13 RX ADMIN — SODIUM CHLORIDE, SODIUM LACTATE, POTASSIUM CHLORIDE, AND CALCIUM CHLORIDE: .6; .31; .03; .02 INJECTION, SOLUTION INTRAVENOUS at 08:08

## 2023-12-13 RX ADMIN — SODIUM CHLORIDE, SODIUM LACTATE, POTASSIUM CHLORIDE, AND CALCIUM CHLORIDE: .6; .31; .03; .02 INJECTION, SOLUTION INTRAVENOUS at 09:27

## 2023-12-13 RX ADMIN — ROCURONIUM BROMIDE 50 MG: 10 INJECTION, SOLUTION INTRAVENOUS at 08:35

## 2023-12-13 RX ADMIN — ONDANSETRON 4 MG: 2 INJECTION INTRAMUSCULAR; INTRAVENOUS at 08:40

## 2023-12-13 RX ADMIN — KETOROLAC TROMETHAMINE 15 MG: 30 INJECTION, SOLUTION INTRAMUSCULAR at 08:51

## 2023-12-13 RX ADMIN — SUGAMMADEX 250 MG: 100 INJECTION, SOLUTION INTRAVENOUS at 10:53

## 2023-12-13 RX ADMIN — OXYCODONE HYDROCHLORIDE AND ACETAMINOPHEN 1 TABLET: 5; 325 TABLET ORAL at 12:10

## 2023-12-13 RX ADMIN — CEFAZOLIN 2000 MG: 1 INJECTION, POWDER, FOR SOLUTION INTRAMUSCULAR; INTRAVENOUS at 08:40

## 2023-12-13 NOTE — OP NOTE
OPERATIVE REPORT  PATIENT NAME: Chu Burnett    :  1955  MRN: [id number]  Pt Location: [facility] OR ROOM 03    SURGERY DATE: 2023    Surgeons and Role:     * April Aguilar MD - Primary     * Kash Ventura MD - Assisting    Preop Diagnosis:  Inguinal hernia [K40.90]    Post-Op Diagnosis Codes:     * Inguinal hernia [K40.90]-incarcerated    Procedure(s):  Right - REPAIR INCARCERATED HERNIA INGUINAL with scrotal component, with mesh -incarcerated    Specimen(s):  * No specimens in log *    Estimated Blood Loss:   Minimal    Drains:  * No LDAs found *    Anesthesia Type:   General    Operative Indications:  Inguinal hernia [K40.90]      Independent, non-smoker, ASA 3, wound class I, BMI 42, weight 280, height 68,  Atrial fibrillation      Complications:   None    Procedure and Technique:  Chu Burnett is a 77 y.o. male was brought into the operative suite and identified visually and by arm band. The patient was placed in the supine position. Careful attention was made towards padding and positioning of the extremities. After sterile prep and drape, a timeout was completed. The prep was dry. Antibiotics were provided. Athrombic pumps in place. 0.25% Marcaine with epinephrine was utilized throughout the procedure. An incision was made  within the right  inguinal region. The incision was carried down through the skin and subcutaneous tissue. The superficial epigastric vein was clamped, ligated and  divided. . The external oblique fibers were identified. The external ring was identified. The external oblique fibers were then split in the direction of their fibers. Hemostats were placed on the cut edges. A self-retaining retractor was then placed. Individual nerve blockade's were completed over the ilioinguinal and iliohypogastric nerves. Exploration of the inguinal canal commenced. A large inguinal hernia was noted with scrotal component.   Preoperative imaging had also revealed component of incarcerated bladder. The cremasteric fibers were then divided along the fiber direction of its fibers the cord structures. The cord was encircled in a atraumatic Penrose drain and preserved during dissection. Identification of a  direct inguinal hernia was performed. High dissection was completed at the level of the internal ring. It was difficult to reduce the large incarcerated hernia secondary to chronic adhesions in the preperitoneum. These were divided. Preperitoneal dissection commenced identifying and preserving the inferior epigastric vessels. A preperitoneal mesh was then inserted. The branch of the genitofemoral nerve was divided in order to help prevent postoperative neuralgia. The inguinal floor was then repaired with interrupted figure-of-eight 0 Vicryl suture. The indirect inguinal ring was repositioned more superiorly while repairing the inguinal transversalis muscular floor. An onlay mesh was then secured to the tendon overlying the lateral pubic tubercle The external oblique fascia was closed with a running 3-0 PDS suture. Additional 0.25% Marcaine with epinephrine was injected over the ilioinguinal and iliohypogastric nerves. 3-0 Vicryl subcutaneous suture was placed into the Carrie's fascia . 4-0 Monocryl suture was used for the subcuticular layer. Dermabond was then applied. The patient was then awakened from general anesthesia and transferred to the recovery room in stable condition. Sponge and instrument count correct ×2. I was present for the entire procedure.     Patient Disposition:  PACU         SIGNATURE: Willie Shen MD  DATE: December 13, 2023  TIME: 11:11 AM

## 2023-12-13 NOTE — ANESTHESIA POSTPROCEDURE EVALUATION
Post-Op Assessment Note    CV Status:  Stable  Pain Score: 0    Pain management: adequate       Mental Status:  Alert and awake   Hydration Status:  Euvolemic   PONV Controlled:  Controlled   Airway Patency:  Patent     Post Op Vitals Reviewed: Yes      Staff: Anesthesiologist, CRNA               /83 (12/13/23 1106)    Temp 98 °F (36.7 °C) (12/13/23 1106)    Pulse 72 (12/13/23 1106)   Resp 16 (12/13/23 1106)    SpO2 95 % (12/13/23 1106)

## 2023-12-13 NOTE — INTERVAL H&P NOTE
H&P reviewed. After examining the patient I find no changes in the patients condition since the H&P had been written.     Vitals:    12/13/23 0703   BP: 150/90   Pulse: 78   Resp: 18   Temp: 98.7 °F (37.1 °C)   SpO2: 96%

## 2023-12-13 NOTE — ANESTHESIA PREPROCEDURE EVALUATION
Procedure:  REPAIR HERNIA INGUINAL (Right: Groin)    Relevant Problems   ANESTHESIA   (-) History of anesthesia complications      CARDIO   (+) Atrial fibrillation (HCC)      GI/HEPATIC   (-) Dysphagia      /RENAL   (-) Chronic kidney disease        Physical Exam    Airway    Mallampati score: II  TM Distance: >3 FB  Neck ROM: full     Dental   No notable dental hx     Cardiovascular  No weak pulses    Pulmonary   No stridor    Other Findings        Anesthesia Plan  ASA Score- 3     Anesthesia Type- general with ASA Monitors. Additional Monitors:     Airway Plan: ETT. Plan Factors-    Chart reviewed. EKG reviewed. Existing labs reviewed. Patient summary reviewed. Induction- intravenous. Postoperative Plan- Plan for postoperative opioid use. Planned trial extubation    Informed Consent- Anesthetic plan and risks discussed with patient. I personally reviewed this patient with the CRNA. Discussed and agreed on the Anesthesia Plan with the CRNA. .              TTE 2020  Normal left ventricular chamber size. Grossly, normal left ventricular     systolic function. Poor endocardial definition precludes accurate regional     wall motion assessment. Estimated left ventricular ejection fraction is     55%. Consider repeat with Definity if clinically indicated. Normal right ventricular size and function. Mildly dilated left atrium. No significant valve abnormalities. Normal diastolic function.

## 2023-12-13 NOTE — ANESTHESIA PROCEDURE NOTES
Anesthesia Notable Event  No anethesia notable event occurred.     Date/Time: 12/13/2023 12:39 PM    Performed by: Juan Sanchez MD  Authorized by: Juan Sanchez MD

## 2023-12-29 ENCOUNTER — OFFICE VISIT (OUTPATIENT)
Dept: SURGERY | Facility: CLINIC | Age: 68
End: 2023-12-29

## 2023-12-29 DIAGNOSIS — K40.90 UNILATERAL INGUINAL HERNIA WITHOUT OBSTRUCTION OR GANGRENE: Primary | ICD-10-CM

## 2023-12-29 PROCEDURE — 99024 POSTOP FOLLOW-UP VISIT: CPT | Performed by: SURGERY

## 2023-12-29 NOTE — PROGRESS NOTES
Assessment/Plan: Patient is status post right inguinal hernia repair.  He feels well.  He offers no complaints.  He is advance his activities nicely.  Incisions healing well.  All questions answered.    There are no diagnoses linked to this encounter.    Pathology: None sent      Postoperative restrictions reviewed. All questions answered.       ______________________________________________________  HPI: Patient presents post operatively.  Right inguinal hernia repair 12/13/2023.               ROS:  General ROS: negative for - chills, fatigue, fever or night sweats, weight loss  Respiratory ROS: no cough, shortness of breath, or wheezing  Cardiovascular ROS: no chest pain or dyspnea on exertion  Genito-Urinary ROS: no dysuria, trouble voiding, or hematuria  Musculoskeletal ROS: negative for - gait disturbance, joint pain or muscle pain  Neurological ROS: no TIA or stroke symptoms  GI ROS: see HPI  Skin ROS: no new rashes or lesions   Lymphatic ROS: no new adenopathy noted by pt.   GYN ROS: see HPI, no new GYN history or bleeding noted  Psy ROS: no new mental or behavioral disturbances         Patient Active Problem List   Diagnosis    Unilateral inguinal hernia without obstruction or gangrene    Atrial fibrillation (HCC)       Allergies:  Patient has no known allergies.      Current Outpatient Medications:     cloNIDine (CATAPRES) 0.1 mg tablet, Take 0.1 mg by mouth 2 (two) times a day, Disp: , Rfl:     dabigatran etexilate (PRADAXA) 150 mg capsu, Take 150 mg by mouth 2 (two) times a day, Disp: , Rfl:     diltiazem (CARDIZEM CD) 120 mg 24 hr capsule, Take 120 mg by mouth daily, Disp: , Rfl:     dofetilide (TIKOSYN) 500 mcg capsule, , Disp: , Rfl:     fish oil-omega-3 fatty acids 1000 MG capsule, Take 2 g by mouth daily, Disp: , Rfl:     furosemide (LASIX) 20 mg tablet, Take 20 mg by mouth 2 (two) times a day, Disp: , Rfl:     multivitamin (THERAGRAN) TABS, Take 1 tablet by mouth daily, Disp: , Rfl:     nebivolol  (BYSTOLIC) 5 mg tablet, Take 20 mg by mouth daily, Disp: , Rfl:     olmesartan (BENICAR) 20 mg tablet, , Disp: , Rfl:     oxyCODONE-acetaminophen (PERCOCET) 5-325 mg per tablet, Take 1 tablet by mouth every 4 (four) hours as needed for moderate pain for up to 10 doses Max Daily Amount: 6 tablets, Disp: 10 tablet, Rfl: 0    potassium chloride (K-DUR,KLOR-CON) 20 mEq tablet, Take 20 mEq by mouth 2 (two) times a day, Disp: , Rfl:     spironolactone (ALDACTONE) 25 mg tablet, Take 25 mg by mouth daily, Disp: , Rfl:     Past Medical History:   Diagnosis Date    Abnormal ECG 12/12/2019 12/24/19  cardioversion & 2/21/20 cardioversion, and Tikosyn an added to meds    Colon polyp 12/12/19    Colonoscopy/benign. f/u 12/2024    COPD (chronic obstructive pulmonary disease) (HCC) 12/12/19 sleep apnea    2/2020 bi-pap use started    History of kidney stones     Hypertension     Irregular heart beat     Macular degeneration     Obesity     11/2023. 278 lbs.    Paroxysmal SVT (supraventricular tachycardia)     Sleep apnea     BiPAP       Past Surgical History:   Procedure Laterality Date    APPENDECTOMY      CARDIOVERSION      COLONOSCOPY N/A 12/09/2016    Procedure: COLONOSCOPY;  Surgeon: Marika Jones MD;  Location: [facility];  Service:     HAND SURGERY Left     NE RPR 1ST INGUN HRNA AGE 5 YRS/> REDUCIBLE Right 12/13/2023    Procedure: REPAIR HERNIA INGUINAL;  Surgeon: Kyler Mary MD;  Location: [facility];  Service: General    TONSILLECTOMY AND ADENOIDECTOMY         Family History   Problem Relation Age of Onset    Cancer Mother         Thyroid with metastasis to the liver    Cancer Father         Prosthetic cancer / hypertension        reports that he has never smoked. He has never used smokeless tobacco. He reports current alcohol use of about 1.0 standard drink of alcohol per week. He reports that he does not use drugs.    PHYSICAL EXAM    There were no vitals taken for this visit.    General: normal,  cooperative, no distress  Abdominal: soft, nondistended, or nontender  Incision: clean, dry, and intact and healing well      Kyler Mary MD    Date: 12/29/2023 Time: 11:23 AM

## 2024-01-12 ENCOUNTER — APPOINTMENT (OUTPATIENT)
Dept: LAB | Age: 69
End: 2024-01-12
Payer: MEDICARE

## 2024-01-12 DIAGNOSIS — R31.9 HEMATURIA, UNSPECIFIED TYPE: ICD-10-CM

## 2024-01-12 DIAGNOSIS — R39.89 SUSPECTED UTI: ICD-10-CM

## 2024-01-12 DIAGNOSIS — N20.0 NEPHROLITHIASIS: ICD-10-CM

## 2024-01-12 PROCEDURE — 87086 URINE CULTURE/COLONY COUNT: CPT

## 2024-01-13 LAB — BACTERIA UR CULT: NORMAL

## 2024-01-17 NOTE — PRE-PROCEDURE INSTRUCTIONS
Pre-Surgery Instructions:   Medication Instructions    cloNIDine (CATAPRES) 0.1 mg tablet Take day of surgery.    dabigatran etexilate (PRADAXA) 150 mg capsu Last dose 1-22-24 PM  per MD    diltiazem (CARDIZEM CD) 120 mg 24 hr capsule Take day of surgery.    dofetilide (TIKOSYN) 500 mcg capsule Take day of surgery.    fish oil-omega-3 fatty acids 1000 MG capsule Stop taking 7 days prior to surgery.    furosemide (LASIX) 20 mg tablet Hold day of surgery.    multivitamin (THERAGRAN) TABS Stop taking 7 days prior to surgery.    nebivolol (BYSTOLIC) 5 mg tablet Take day of surgery.    olmesartan (BENICAR) 20 mg tablet Hold day of surgery.    potassium chloride (K-DUR,KLOR-CON) 20 mEq tablet Hold day of surgery.    spironolactone (ALDACTONE) 25 mg tablet Take night before surgery   Medication instructions for day surgery reviewed. Please use only a sip of water to take your instructed medications. Avoid all over the counter vitamins, supplements and NSAIDS for one week prior to surgery per anesthesia guidelines. Tylenol is ok to take as needed.     You will receive a call one business day prior to surgery with an arrival time and hospital directions. If your surgery is scheduled on a Monday, the hospital will be calling you on the Friday prior to your surgery. If you have not heard from anyone by 8pm, please call the hospital supervisor through the hospital  at [phone].    Do not eat or drink anything after midnight the night before your surgery, including candy, mints, lifesavers, or chewing gum. Do not drink alcohol 24hrs before your surgery. Try not to smoke at least 24hrs before your surgery.       Follow the pre surgery showering instructions as listed in the “My Surgical Experience Booklet” or otherwise provided by your surgeon's office. Do not use a blade to shave the surgical area 1 week before surgery. It is okay to use a clean electric clippers up to 24 hours before surgery. Do not apply any  lotions, creams, including makeup, cologne, deodorant, or perfumes after showering on the day of your surgery. Do not use dry shampoo, hair spray, hair gel, or any type of hair products.     No contact lenses, eye make-up, or artificial eyelashes. Remove nail polish, including gel polish, and any artificial, gel, or acrylic nails if possible. Remove all jewelry including rings and body piercing jewelry.     Wear causal clothing that is easy to take on and off. Consider your type of surgery.    Keep any valuables, jewelry, piercings at home. Please bring any specially ordered equipment (sling, braces) if indicated.    Arrange for a responsible person to drive you to and from the hospital on the day of your surgery. Visitor Guidelines discussed.     Call the surgeon's office with any new illnesses, exposures, or additional questions prior to surgery.    Please reference your “My Surgical Experience Booklet” for additional information to prepare for your upcoming surgery.

## 2024-01-25 ENCOUNTER — ANESTHESIA EVENT (OUTPATIENT)
Dept: PERIOP | Facility: HOSPITAL | Age: 69
End: 2024-01-25
Payer: MEDICARE

## 2024-01-25 NOTE — ANESTHESIA PREPROCEDURE EVALUATION
Procedure:  CYSTOSCOPY URETEROSCOPY WITH LITHOTRIPSY HOLMIUM LASER, RETROGRADE PYELOGRAM AND INSERTION STENT URETERAL (Right: Bladder)    Relevant Problems   CARDIO   (+) Atrial fibrillation (HCC)   Paroxysmal SVT (supraventricular tachycardia)          COPD (chronic obstructive pulmonary disease) (HCC) 2/2020 bi-pap use started Sleep apnea BiPAP   CPAP (continuous positive airway pressure) dependence  Hyperlipidemia    Kidney stone      echnically difficult study.     Normal left ventricular chamber size.  Grossly, normal left ventricular     systolic function. Poor endocardial definition precludes accurate regional     wall motion assessment.  Estimated left ventricular ejection fraction is     55%.  Consider repeat with Definity if clinically indicated.     Normal right ventricular size and function.    Mildly dilated left atrium.    No significant valve abnormalities.     Normal diastolic function.     Physical Exam    Airway    Mallampati score: II  TM Distance: >3 FB  Neck ROM: full     Dental       Cardiovascular      Pulmonary      Other Findings        Anesthesia Plan  ASA Score- 3     Anesthesia Type- general with ASA Monitors.         Additional Monitors:     Airway Plan: LMA.           Plan Factors-    Chart reviewed.                      Induction- intravenous.    Postoperative Plan-     Informed Consent- Anesthetic plan and risks discussed with patient.  I personally reviewed this patient with the CRNA. Discussed and agreed on the Anesthesia Plan with the CRNA..

## 2024-01-26 ENCOUNTER — HOSPITAL ENCOUNTER (OUTPATIENT)
Facility: HOSPITAL | Age: 69
Setting detail: OUTPATIENT SURGERY
Discharge: HOME | End: 2024-01-26
Attending: UROLOGY | Admitting: UROLOGY
Payer: MEDICARE

## 2024-01-26 ENCOUNTER — ANESTHESIA (OUTPATIENT)
Dept: PERIOP | Facility: HOSPITAL | Age: 69
End: 2024-01-26
Payer: MEDICARE

## 2024-01-26 ENCOUNTER — APPOINTMENT (OUTPATIENT)
Dept: RADIOLOGY | Facility: HOSPITAL | Age: 69
End: 2024-01-26
Payer: MEDICARE

## 2024-01-26 VITALS
WEIGHT: 278 LBS | SYSTOLIC BLOOD PRESSURE: 154 MMHG | OXYGEN SATURATION: 96 % | DIASTOLIC BLOOD PRESSURE: 89 MMHG | HEIGHT: 68 IN | HEART RATE: 65 BPM | BODY MASS INDEX: 42.13 KG/M2 | TEMPERATURE: 97.6 F | RESPIRATION RATE: 18 BRPM

## 2024-01-26 DIAGNOSIS — N20.0 NEPHROLITHIASIS: ICD-10-CM

## 2024-01-26 DIAGNOSIS — N20.0 RENAL CALCULUS, RIGHT: Primary | ICD-10-CM

## 2024-01-26 PROCEDURE — C1758 CATHETER, URETERAL: HCPCS | Performed by: UROLOGY

## 2024-01-26 PROCEDURE — C1894 INTRO/SHEATH, NON-LASER: HCPCS | Performed by: UROLOGY

## 2024-01-26 PROCEDURE — NC001 PR NO CHARGE: Performed by: UROLOGY

## 2024-01-26 PROCEDURE — C2617 STENT, NON-COR, TEM W/O DEL: HCPCS | Performed by: UROLOGY

## 2024-01-26 PROCEDURE — 74420 UROGRAPHY RTRGR +-KUB: CPT

## 2024-01-26 PROCEDURE — C1769 GUIDE WIRE: HCPCS | Performed by: UROLOGY

## 2024-01-26 PROCEDURE — 52356 CYSTO/URETERO W/LITHOTRIPSY: CPT | Performed by: UROLOGY

## 2024-01-26 PROCEDURE — 87086 URINE CULTURE/COLONY COUNT: CPT | Performed by: UROLOGY

## 2024-01-26 DEVICE — INLAY OPTIMA URETERAL STENT W/O GUIDEWIRE
Type: IMPLANTABLE DEVICE | Site: URETER | Status: FUNCTIONAL
Brand: BARD® INLAY OPTIMA® URETERAL STENT

## 2024-01-26 RX ORDER — SODIUM CHLORIDE 9 MG/ML
125 INJECTION, SOLUTION INTRAVENOUS CONTINUOUS
Status: DISCONTINUED | OUTPATIENT
Start: 2024-01-26 | End: 2024-01-26 | Stop reason: HOSPADM

## 2024-01-26 RX ORDER — MAGNESIUM HYDROXIDE 1200 MG/15ML
LIQUID ORAL AS NEEDED
Status: DISCONTINUED | OUTPATIENT
Start: 2024-01-26 | End: 2024-01-26 | Stop reason: HOSPADM

## 2024-01-26 RX ORDER — HYDROCODONE BITARTRATE AND ACETAMINOPHEN 5; 325 MG/1; MG/1
1 TABLET ORAL EVERY 6 HOURS PRN
Qty: 6 TABLET | Refills: 0 | Status: SHIPPED | OUTPATIENT
Start: 2024-01-26 | End: 2024-02-05

## 2024-01-26 RX ORDER — MIDAZOLAM HYDROCHLORIDE 2 MG/2ML
INJECTION, SOLUTION INTRAMUSCULAR; INTRAVENOUS AS NEEDED
Status: DISCONTINUED | OUTPATIENT
Start: 2024-01-26 | End: 2024-01-26

## 2024-01-26 RX ORDER — DEXAMETHASONE SODIUM PHOSPHATE 10 MG/ML
INJECTION, SOLUTION INTRAMUSCULAR; INTRAVENOUS AS NEEDED
Status: DISCONTINUED | OUTPATIENT
Start: 2024-01-26 | End: 2024-01-26

## 2024-01-26 RX ORDER — ONDANSETRON 2 MG/ML
INJECTION INTRAMUSCULAR; INTRAVENOUS AS NEEDED
Status: DISCONTINUED | OUTPATIENT
Start: 2024-01-26 | End: 2024-01-26

## 2024-01-26 RX ORDER — METOCLOPRAMIDE HYDROCHLORIDE 5 MG/ML
10 INJECTION INTRAMUSCULAR; INTRAVENOUS ONCE AS NEEDED
Status: DISCONTINUED | OUTPATIENT
Start: 2024-01-26 | End: 2024-01-26 | Stop reason: HOSPADM

## 2024-01-26 RX ORDER — LIDOCAINE HYDROCHLORIDE 10 MG/ML
INJECTION, SOLUTION EPIDURAL; INFILTRATION; INTRACAUDAL; PERINEURAL AS NEEDED
Status: DISCONTINUED | OUTPATIENT
Start: 2024-01-26 | End: 2024-01-26

## 2024-01-26 RX ORDER — HYDROCODONE BITARTRATE AND ACETAMINOPHEN 5; 325 MG/1; MG/1
1 TABLET ORAL EVERY 4 HOURS PRN
Status: DISCONTINUED | OUTPATIENT
Start: 2024-01-26 | End: 2024-01-26 | Stop reason: HOSPADM

## 2024-01-26 RX ORDER — ONDANSETRON 2 MG/ML
4 INJECTION INTRAMUSCULAR; INTRAVENOUS ONCE AS NEEDED
Status: DISCONTINUED | OUTPATIENT
Start: 2024-01-26 | End: 2024-01-26 | Stop reason: HOSPADM

## 2024-01-26 RX ORDER — PROPOFOL 10 MG/ML
INJECTION, EMULSION INTRAVENOUS AS NEEDED
Status: DISCONTINUED | OUTPATIENT
Start: 2024-01-26 | End: 2024-01-26

## 2024-01-26 RX ORDER — CEFAZOLIN SODIUM 1 G/3ML
INJECTION, POWDER, FOR SOLUTION INTRAMUSCULAR; INTRAVENOUS AS NEEDED
Status: DISCONTINUED | OUTPATIENT
Start: 2024-01-26 | End: 2024-01-26

## 2024-01-26 RX ORDER — SODIUM CHLORIDE, SODIUM LACTATE, POTASSIUM CHLORIDE, CALCIUM CHLORIDE 600; 310; 30; 20 MG/100ML; MG/100ML; MG/100ML; MG/100ML
INJECTION, SOLUTION INTRAVENOUS CONTINUOUS PRN
Status: DISCONTINUED | OUTPATIENT
Start: 2024-01-26 | End: 2024-01-26

## 2024-01-26 RX ORDER — FENTANYL CITRATE 50 UG/ML
INJECTION, SOLUTION INTRAMUSCULAR; INTRAVENOUS AS NEEDED
Status: DISCONTINUED | OUTPATIENT
Start: 2024-01-26 | End: 2024-01-26

## 2024-01-26 RX ORDER — FENTANYL CITRATE/PF 50 MCG/ML
50 SYRINGE (ML) INJECTION
Status: DISCONTINUED | OUTPATIENT
Start: 2024-01-26 | End: 2024-01-26 | Stop reason: HOSPADM

## 2024-01-26 RX ORDER — CEPHALEXIN 500 MG/1
500 CAPSULE ORAL 3 TIMES DAILY
Qty: 9 CAPSULE | Refills: 0 | Status: SHIPPED | OUTPATIENT
Start: 2024-01-26 | End: 2024-01-29

## 2024-01-26 RX ORDER — GLYCOPYRROLATE 0.2 MG/ML
INJECTION INTRAMUSCULAR; INTRAVENOUS AS NEEDED
Status: DISCONTINUED | OUTPATIENT
Start: 2024-01-26 | End: 2024-01-26

## 2024-01-26 RX ADMIN — SODIUM CHLORIDE, SODIUM LACTATE, POTASSIUM CHLORIDE, AND CALCIUM CHLORIDE: .6; .31; .03; .02 INJECTION, SOLUTION INTRAVENOUS at 07:29

## 2024-01-26 RX ADMIN — DEXAMETHASONE SODIUM PHOSPHATE 10 MG: 10 INJECTION, SOLUTION INTRAMUSCULAR; INTRAVENOUS at 07:37

## 2024-01-26 RX ADMIN — PROPOFOL 200 MG: 10 INJECTION, EMULSION INTRAVENOUS at 07:35

## 2024-01-26 RX ADMIN — GLYCOPYRROLATE 0.2 MG: 0.2 INJECTION, SOLUTION INTRAMUSCULAR; INTRAVENOUS at 07:37

## 2024-01-26 RX ADMIN — MIDAZOLAM 2 MG: 1 INJECTION INTRAMUSCULAR; INTRAVENOUS at 07:29

## 2024-01-26 RX ADMIN — FENTANYL CITRATE 25 MCG: 50 INJECTION INTRAMUSCULAR; INTRAVENOUS at 08:00

## 2024-01-26 RX ADMIN — FENTANYL CITRATE 25 MCG: 50 INJECTION INTRAMUSCULAR; INTRAVENOUS at 07:42

## 2024-01-26 RX ADMIN — LIDOCAINE HYDROCHLORIDE 50 MG: 10 INJECTION, SOLUTION EPIDURAL; INFILTRATION; INTRACAUDAL; PERINEURAL at 07:35

## 2024-01-26 RX ADMIN — FENTANYL CITRATE 50 MCG: 50 INJECTION INTRAMUSCULAR; INTRAVENOUS at 08:02

## 2024-01-26 RX ADMIN — HYDROCODONE BITARTRATE AND ACETAMINOPHEN 1 TABLET: 5; 325 TABLET ORAL at 10:07

## 2024-01-26 RX ADMIN — ONDANSETRON 4 MG: 2 INJECTION INTRAMUSCULAR; INTRAVENOUS at 07:47

## 2024-01-26 RX ADMIN — CEFAZOLIN 3000 MG: 1 INJECTION, POWDER, FOR SOLUTION INTRAMUSCULAR; INTRAVENOUS at 07:35

## 2024-01-26 NOTE — ANESTHESIA POSTPROCEDURE EVALUATION
Post-Op Assessment Note    CV Status:  Stable    Pain management: adequate       Mental Status:  Sleepy   Hydration Status:  Euvolemic   PONV Controlled:  Controlled   Airway Patency:  Patent     Post Op Vitals Reviewed: Yes    No anethesia notable event occurred.    Staff: CRNA               BP   150/85   Temp      Pulse  85   Resp      SpO2   100 FM 6l

## 2024-01-26 NOTE — H&P
"   Assessment  1 cm right renal pelvis calculus      Discussion  First we discussed his CT renal protocol which fortunately shows no evidence of malignancy or neoplastic process.  The scan does reveal 1 cm right renal pelvis calculus which could very well be the etiology for his gross hematuria on Pradaxa.  As he will likely be on anticoagulation indefinitely we discussed performing right-sided ureteroscopy with holmium laser lithotripsy, right retrograde pyelography and right ureteral stent insertion to remove the stone which is too large to pass and to alleviate his intermittent gross hematuria.  I recommend that he hold his Pradaxa for 48 hours prior to surgery as he is on anticoagulation for atrial fibrillation      History of Present Illness  68 y.o. male with a history of gross hematuria.  This has been intermittent.  He denies any flank pain.  He had a CT renal protocol as per below.     Recent PSA level from September 2023 is 1.86.  He states that his father had prostate cancer.  He underwent a CT renal protocol performed on September 22, 2023 which reveals multiple simple renal cyst on the right kidney.  There is a 1 cm nonobstructing renal pelvis calculus on the right.  A moderate right inguinal hernia containing a portion of the bladder is identified as well.     BP (!) 156/105   Pulse 76   Temp 98.5 °F (36.9 °C)   Resp 18   Ht 5' 8\" (1.727 m)   Wt 126 kg (278 lb)   SpO2 96%   BMI 42.27 kg/m²     On examination he is in no acute distress.  His abdomen is soft nontender nondistended.  Respiratory no distress.  Cardiac is regular at this time.  Skin is warm.  Extremities without edema.  Neurologic is grossly intact and nonfocal  "

## 2024-01-26 NOTE — OP NOTE
OPERATIVE REPORT  PATIENT NAME: Cristopher Ervin    :  1955  MRN: [id number]  Pt Location:  [facility]ST[facility] ROOM 01    SURGERY DATE: 2024    Surgeons and Role:     * Meek Pacheco MD - Primary    Preop Diagnosis:  Nephrolithiasis [N20.0]    Post-Op Diagnosis Codes:     * Nephrolithiasis [N20.0]    Procedure(s):  Right - CYSTOSCOPY URETEROSCOPY WITH LITHOTRIPSY HOLMIUM LASER. RETROGRADE PYELOGRAM AND INSERTION STENT URETERAL    Specimen(s):  ID Type Source Tests Collected by Time Destination   A :  Urine Urine, Cystoscopic URINE CULTURE Meek Pacheco MD 2024 0750        Estimated Blood Loss:   Minimal    Drains:  Right 26/6 Jamaican double-J ureteral stent with string    Anesthesia Type:   General    Operative Indications:  Nephrolithiasis [N20.0]      Operative Findings:  Large right renal pelvis calculus greater than 1 cm decimated with holmium laser lithotripsy to fragment small enough to be passable.  Right 26/6 Jamaican double-J ureteral stent with string placed    Complications:   None    Procedure and Technique:  Cristopher is a 68-year-old male with a history of gross hematuria on Pradaxa with a history of atrial fibrillation.  CT scan shows a 1+ centimeter right renal pelvis calculus.  He now returns to the operating room on 2024 to undergo right-sided ureteroscopy with holmium laser lithotripsy.       Risk and benefits of the procedure were discussed and reviewed.  Informed consent was obtained.  After the smooth induction of general LMA anesthesia, the patient was placed in the dorsal lithotomy position.  His genitalia was prepped and draped in a sterile fashion.  Intravenous antibiotics were administered.  A timeout was performed with all members of the operative team confirming the patient's identity, procedure to be performed, and laterality of the case.    A 22 Jamaican rigid cystoscope with 30° lens was inserted.  The bladder was thoroughly inspected.  An enlarged prostate  with a high bladder neck was noted.  Attention was focused on the right ureteral orifice.  A 5 Turkmen open-ended catheter was inserted and a right retrograde pyelogram was performed revealing filling defect in the central portion of the right renal pelvis consistent with a stone.  A wire was then reinserted and a dual-lumen catheter was placed followed by a second wire.  A ureteral access sheath was then inserted.  An Olympus flexible ureteroscope was then passed.  The kidney was thoroughly inspected.  A stone was identified in the right renal pelvis.  The 272 µ holmium laser fiber was utilized to decimate the stone until the fragments were passable.  Contrast was injected.  The kidney was thoroughly reinspected.  There were no residual stones identified of significant size.    The scope was slowly withdrawn through the ureter as the sheath was removed.  There were no residual stones within the ureter.  The wire was backloaded through the cystoscope and a right 26-6 Turkmen double-J ureteral stent was then placed in the standard fashion.  The proximal coil was appreciated in the right renal pelvis and the distal coil was visualized within the bladder.  A string was left in place.  The bladder was emptied and the cystoscope was removed.  The string was secured to the dorsum of the phallus with Tegaderm.  Overall the patient tolerated the procedure well and there were no complications.  The patient was extubated in the operating room and transferred to the PACU in stable condition at the conclusion of the case.    Patient Disposition:  PACU         SIGNATURE: Meek Pacheco MD  DATE: January 26, 2024  TIME: 8:18 AM

## 2024-01-28 LAB — BACTERIA UR CULT: NORMAL

## 2024-01-29 ENCOUNTER — TELEPHONE (OUTPATIENT)
Dept: UROLOGY | Facility: AMBULATORY SURGERY CENTER | Age: 69
End: 2024-01-29

## 2024-01-29 DIAGNOSIS — N20.0 NEPHROLITHIASIS: Primary | ICD-10-CM

## 2024-01-29 NOTE — TELEPHONE ENCOUNTER
Called Cristopher back and notified that orders for u/s and KUB placed in system - once we get results we will call him phone number.  Notified him that we will call with results.

## 2024-01-29 NOTE — TELEPHONE ENCOUNTER
Post Op Note    Cristopher Ervin is a 68 y.o. male s/p cysto Right stent performed 1/26/2024.  Cristopher Ervin is a patient of Dr. Dr. Pacheco and is seen at the [facility] office.     How would you rate your pain on a scale from 1 to 10, 10 being the worst pain ever? PRN discomfort on right flank  Have you had a fever? No  Have your bowel movements been regular? Yes  Do you have any difficulty urinating? No some burning Do you have any other questions or concerns that I can address at this time? He will remove stent himself later today - review stent removal instrucitons again and he will call and LM that he removed it

## 2024-01-29 NOTE — TELEPHONE ENCOUNTER
PT called and stated that he has removed the stent and everything was intact.  Pt verifying what his follow up should be and if any imaging is needed prior to follow up.  Pt prefers rosales or aremn if possible.  VM with appointment information can be left.     Pt can be reached at [phone]

## 2024-03-20 ENCOUNTER — APPOINTMENT (OUTPATIENT)
Dept: RADIOLOGY | Age: 69
End: 2024-03-20
Payer: MEDICARE

## 2024-03-20 ENCOUNTER — HOSPITAL ENCOUNTER (OUTPATIENT)
Dept: RADIOLOGY | Age: 69
Discharge: HOME | End: 2024-03-20
Payer: MEDICARE

## 2024-03-20 DIAGNOSIS — N20.0 NEPHROLITHIASIS: ICD-10-CM

## 2024-03-20 PROCEDURE — 74018 RADEX ABDOMEN 1 VIEW: CPT

## 2024-03-20 PROCEDURE — 76775 US EXAM ABDO BACK WALL LIM: CPT

## 2024-04-03 DIAGNOSIS — N20.0 NEPHROLITHIASIS: Primary | ICD-10-CM

## 2024-04-03 DIAGNOSIS — N28.1 RENAL CYST: ICD-10-CM

## 2024-04-08 ENCOUNTER — TELEPHONE (OUTPATIENT)
Dept: UROLOGY | Facility: MEDICAL CENTER | Age: 69
End: 2024-04-08

## 2024-04-08 NOTE — TELEPHONE ENCOUNTER
----- Message from Reinaldo Johnson PA-C sent at 4/3/2024  8:24 AM EDT -----  Please call patient to inform him that his ultrasound/KUB demonstrates resolution of kidney stones with no kidney stones being visualized.  Of note he does have a mildly complex cyst on this ultrasound but reassuringly he had a CT renal protocol in September 2023 that characterize his multiple cysts as being simple which is indicates nothing to be concerned about.  At this point in time we will continue to keep an eye out on the cyst and for recurrent stones with a repeat ultrasound and x-ray in 1 year I placed order for imaging please ensure the patient has a follow-up next year.      Called patient to go over his US/KUB demonstrates resolution of his kidney stones being visualized. The US did note a mildly complex cyst. Reassuringly he had a CT renal protocol in September 2023 that characterize his multiple cysts as being simple which is indicates nothing to be concerned about at this time. As of now we are just going to keep an eye on the cyst and any recurrent stones. He can follow up in 1 year with an US and XR we do not do appts a year out but I did let patient know to give us a call when it is closer around this time next year. Pt understands everything and is agreeable.

## 2025-03-24 ENCOUNTER — APPOINTMENT (OUTPATIENT)
Dept: RADIOLOGY | Age: 70
End: 2025-03-24
Payer: MEDICARE

## 2025-03-24 ENCOUNTER — HOSPITAL ENCOUNTER (OUTPATIENT)
Dept: RADIOLOGY | Age: 70
Discharge: HOME | End: 2025-03-24
Payer: MEDICARE

## 2025-03-24 DIAGNOSIS — N28.1 RENAL CYST: ICD-10-CM

## 2025-03-24 DIAGNOSIS — N20.0 NEPHROLITHIASIS: ICD-10-CM

## 2025-03-24 PROCEDURE — 76775 US EXAM ABDO BACK WALL LIM: CPT

## 2025-03-24 PROCEDURE — 74018 RADEX ABDOMEN 1 VIEW: CPT

## 2025-04-23 ENCOUNTER — TELEPHONE (OUTPATIENT)
Dept: UROLOGY | Facility: AMBULATORY SURGERY CENTER | Age: 70
End: 2025-04-23

## 2025-04-23 NOTE — TELEPHONE ENCOUNTER
Called and spoke to pt. He has done a virtual visit before through [email or web address] and will be on the link about 2:15 tomorrow. He has already done e-check in. He wants link to come to email.     Roxana- I am not able to change his appointment type to virtual

## 2025-04-23 NOTE — TELEPHONE ENCOUNTER
Charmaine Sotomayor, BRENNEN routed this conversation to [facility]  Cristopher Ervin to [facility] (supporting Meek Pacheco MD)         4/23/25 10:54 AM  I have an appointment with Miss Gustavo SALCIDO tomorrow. I have come down with an upper respiratory cold.  Should I keep my appointment with her and wear a mask, or can we do a virtual phone call? Or cancel and Reschedule.? The appointment is to review my  recent x-ray and ultrasound findings done one year three month follow up on a cystoscopy urteraloscopy  lithotripsy don   Jan. 26,2024.   Please advise.  I would be more than happy to do a virtual phone visit and discuss findings and make appropriate arrangements for follow up appointments or studies needed to see kidney  cyst changes or future stones.   Thank you, Cristopher Ervin, July 31, 1955 cell phone [phone].

## 2025-04-24 ENCOUNTER — TELEMEDICINE (OUTPATIENT)
Dept: UROLOGY | Facility: AMBULATORY SURGERY CENTER | Age: 70
End: 2025-04-24
Payer: MEDICARE

## 2025-04-24 DIAGNOSIS — N20.0 NEPHROLITHIASIS: ICD-10-CM

## 2025-04-24 DIAGNOSIS — N28.1 RENAL CYST: Primary | ICD-10-CM

## 2025-04-24 PROCEDURE — 99213 OFFICE O/P EST LOW 20 MIN: CPT

## 2025-04-24 NOTE — ASSESSMENT & PLAN NOTE
Patient with history of nephrolithiasis and ureterolithiasis that required surgical intervention in January 2024 by Dr. Pacheco.  He has had no kidney stone events since that time.  His repeat kidney and bladder ultrasound on 3/24/2025 does not demonstrate any nephrolithiasis.  KUB x-ray also performed does not demonstrate any calcifications.    Discussed diet lifestyle modifications to prevent future kidney stone formation including increasing water intake to 2 to 3 L daily, adding citrus to drinks, following a low sodium and low calcium oxalate diet.    Will plan to follow-up in 1 year with a kidney and bladder ultrasound.

## 2025-04-24 NOTE — PROGRESS NOTES
Virtual Regular VisitName: Cristopher Ervin      : 1955      MRN: [id number]  Encounter Provider: LOLY Duran  Encounter Date: 2025   Encounter department: [facility] [place]  :  Assessment & Plan  Renal cyst  Kidney and bladder ultrasound on 3/24/2025 demonstrating bilateral renal cyst.  There is a thinly septated cyst in the right upper pole measuring 2.1 cm.  Will plan to follow-up in 12 months with a repeat kidney and bladder ultrasound to ensure stability and no suspicious characteristics.  Okay  Orders:    US kidney and bladder; Future    Nephrolithiasis  Patient with history of nephrolithiasis and ureterolithiasis that required surgical intervention in 2024 by Dr. Pacheco.  He has had no kidney stone events since that time.  His repeat kidney and bladder ultrasound on 3/24/2025 does not demonstrate any nephrolithiasis.  KUB x-ray also performed does not demonstrate any calcifications.    Discussed diet lifestyle modifications to prevent future kidney stone formation including increasing water intake to 2 to 3 L daily, adding citrus to drinks, following a low sodium and low calcium oxalate diet.    Will plan to follow-up in 1 year with a kidney and bladder ultrasound.         History of Present Illness     Patient was requesting virtual visit today due to ongoing cold symptoms.  He states that he is overall starting to feel little bit better.  He denies any urinary/urological complaints.  He denies any issues with recent kidney stone passage.  He states that he does try to stay well-hydrated with water during the day to help with kidney stone formation.      Review of Systems   Constitutional:  Negative for chills and fever.   Respiratory: Negative.  Negative for cough and shortness of breath.    Cardiovascular: Negative.  Negative for chest pain.   Gastrointestinal: Negative.  Negative for abdominal distention, abdominal pain, nausea and vomiting.    Genitourinary:  Negative for decreased urine volume, difficulty urinating, dysuria, flank pain, frequency, hematuria, penile discharge, penile pain, penile swelling, scrotal swelling, testicular pain and urgency.   Skin: Negative.  Negative for rash.   Neurological: Negative.    Hematological:  Negative for adenopathy. Does not bruise/bleed easily.       Objective   There were no vitals taken for this visit.    Physical Exam  Constitutional:       Appearance: Normal appearance.   Musculoskeletal:      Cervical back: Normal range of motion.   Neurological:      General: No focal deficit present.      Mental Status: He is alert and oriented to person, place, and time.   Psychiatric:         Mood and Affect: Mood normal.         Behavior: Behavior normal.         Thought Content: Thought content normal.         Judgment: Judgment normal.         Administrative Statements   Encounter provider LOLY Duran    The Patient is located at Home and in the following state in which I hold an active license [place].    The patient was identified by name and date of birth. Cristopher Ervin was informed that this is a telemedicine visit and that the visit is being conducted through the Epic Embedded platform. He agrees to proceed..  My office door was closed. No one else was in the room.  He acknowledged consent and understanding of privacy and security of the video platform. The patient has agreed to participate and understands they can discontinue the visit at any time.    I have spent a total time of 10 minutes in caring for this patient on the day of the visit/encounter including Diagnostic results and Instructions for management, not including the time spent for establishing the audio/video connection.

## (undated) DEVICE — GUIDEWIRE STRGHT TIP 0.035 IN  SOLO PLUS

## (undated) DEVICE — SUT MONOCRYL 4-0 PS-2 27 IN Y426H

## (undated) DEVICE — NEEDLE HYPO 22G X 1-1/2 IN

## (undated) DEVICE — PLUMEPEN PRO 10FT

## (undated) DEVICE — ADHESIVE SKIN HIGH VISCOSITY EXOFIN 1ML

## (undated) DEVICE — CHLORAPREP HI-LITE 26ML ORANGE

## (undated) DEVICE — GLOVE SRG BIOGEL ECLIPSE 7

## (undated) DEVICE — PENROSE DRAIN, 18 X 3 8: Brand: CARDINAL HEALTH

## (undated) DEVICE — FIBER BALL TIP QUANTA 272 MICRON

## (undated) DEVICE — BETHLEHEM UNIVERSAL MINOR GEN: Brand: CARDINAL HEALTH

## (undated) DEVICE — CATH URETERAL 5FR X 70 CM FLEX TIP POLYUR BARD

## (undated) DEVICE — UNDYED BRAIDED (POLYGLACTIN 910), SYNTHETIC ABSORBABLE SUTURE: Brand: COATED VICRYL

## (undated) DEVICE — ANTIBACTERIAL UNDYED BRAIDED (POLYGLACTIN 910), SYNTHETIC ABSORBABLE SUTURE: Brand: COATED VICRYL

## (undated) DEVICE — ELECTRODE BLADE MOD E-Z CLEAN 2.5IN 6.4CM -0012M

## (undated) DEVICE — SUT VICRYL 2-0 REEL 54 IN J286G

## (undated) DEVICE — SUT PDS PLUS 3-0 SH 27IN PDP316H

## (undated) DEVICE — SPECIMEN CONTAINER STERILE PEEL PACK

## (undated) DEVICE — INTENDED FOR TISSUE SEPARATION, AND OTHER PROCEDURES THAT REQUIRE A SHARP SURGICAL BLADE TO PUNCTURE OR CUT.: Brand: BARD-PARKER SAFETY BLADES SIZE 15, STERILE

## (undated) DEVICE — 3M™ TEGADERM™ TRANSPARENT FILM DRESSING FRAME STYLE, 1626W, 4 IN X 4-3/4 IN (10 CM X 12 CM), 50/CT 4CT/CASE: Brand: 3M™ TEGADERM™

## (undated) DEVICE — PACK TUR

## (undated) DEVICE — SHEATH URETERAL ACCESS 12/14FR 35CM PROXIS

## (undated) DEVICE — SYRINGE 10ML LL

## (undated) DEVICE — ENDOSCOPIC VALVE WITH ADAPTER.: Brand: SURSEAL® II

## (undated) DEVICE — CATH URET .038 10FR 50CM DUAL LUMEN

## (undated) DEVICE — GLOVE SRG BIOGEL ECLIPSE 7.5